# Patient Record
Sex: MALE | Race: OTHER | Employment: FULL TIME | URBAN - METROPOLITAN AREA
[De-identification: names, ages, dates, MRNs, and addresses within clinical notes are randomized per-mention and may not be internally consistent; named-entity substitution may affect disease eponyms.]

---

## 2018-08-01 ENCOUNTER — HOSPITAL ENCOUNTER (EMERGENCY)
Facility: HOSPITAL | Age: 38
Discharge: HOME | End: 2018-08-01

## 2018-08-01 ENCOUNTER — APPOINTMENT (OUTPATIENT)
Dept: ULTRASOUND IMAGING | Facility: HOSPITAL | Age: 38
End: 2018-08-01
Attending: NURSE PRACTITIONER

## 2018-08-01 VITALS
SYSTOLIC BLOOD PRESSURE: 127 MMHG | RESPIRATION RATE: 18 BRPM | WEIGHT: 240.06 LBS | DIASTOLIC BLOOD PRESSURE: 64 MMHG | TEMPERATURE: 99 F | HEIGHT: 62 IN | HEART RATE: 60 BPM | BODY MASS INDEX: 44.18 KG/M2 | OXYGEN SATURATION: 96 %

## 2018-08-01 DIAGNOSIS — S81.802A OPEN WOUND OF LEFT LOWER LEG, INITIAL ENCOUNTER: Primary | ICD-10-CM

## 2018-08-01 LAB
ALBUMIN SERPL BCP-MCNC: 3.9 G/DL (ref 3.5–4.8)
ALP SERPL-CCNC: 79 U/L (ref 32–100)
ALT SERPL-CCNC: 71 U/L (ref 17–63)
ANION GAP SERPL CALC-SCNC: 6 MMOL/L (ref 0–18)
AST SERPL-CCNC: 48 U/L (ref 15–41)
BASOPHILS # BLD: 0 K/UL (ref 0–0.2)
BASOPHILS NFR BLD: 0 %
BILIRUB DIRECT SERPL-MCNC: 0.1 MG/DL (ref 0–0.2)
BILIRUB SERPL-MCNC: 0.5 MG/DL (ref 0.3–1.2)
BUN SERPL-MCNC: 20 MG/DL (ref 8–20)
BUN/CREAT SERPL: 18.3 (ref 10–20)
CALCIUM SERPL-MCNC: 9.3 MG/DL (ref 8.5–10.5)
CHLORIDE SERPL-SCNC: 102 MMOL/L (ref 95–110)
CO2 SERPL-SCNC: 29 MMOL/L (ref 22–32)
CREAT SERPL-MCNC: 1.09 MG/DL (ref 0.5–1.5)
EOSINOPHIL # BLD: 0.1 K/UL (ref 0–0.7)
EOSINOPHIL NFR BLD: 1 %
ERYTHROCYTE [DISTWIDTH] IN BLOOD BY AUTOMATED COUNT: 13.3 % (ref 11–15)
GLUCOSE SERPL-MCNC: 122 MG/DL (ref 70–99)
HCT VFR BLD AUTO: 41.7 % (ref 41–52)
HGB BLD-MCNC: 14.3 G/DL (ref 13.5–17.5)
LYMPHOCYTES # BLD: 2.1 K/UL (ref 1–4)
LYMPHOCYTES NFR BLD: 19 %
MCH RBC QN AUTO: 32.3 PG (ref 27–32)
MCHC RBC AUTO-ENTMCNC: 34.2 G/DL (ref 32–37)
MCV RBC AUTO: 94.4 FL (ref 80–100)
MONOCYTES # BLD: 0.7 K/UL (ref 0–1)
MONOCYTES NFR BLD: 7 %
NEUTROPHILS # BLD AUTO: 7.8 K/UL (ref 1.8–7.7)
NEUTROPHILS NFR BLD: 73 %
OSMOLALITY UR CALC.SUM OF ELEC: 288 MOSM/KG (ref 275–295)
PLATELET # BLD AUTO: 292 K/UL (ref 140–400)
PMV BLD AUTO: 7.2 FL (ref 7.4–10.3)
POTASSIUM SERPL-SCNC: 3.6 MMOL/L (ref 3.3–5.1)
PROT SERPL-MCNC: 7.4 G/DL (ref 5.9–8.4)
RBC # BLD AUTO: 4.41 M/UL (ref 4.5–5.9)
SODIUM SERPL-SCNC: 137 MMOL/L (ref 136–144)
WBC # BLD AUTO: 10.8 K/UL (ref 4–11)

## 2018-08-01 PROCEDURE — 85025 COMPLETE CBC W/AUTO DIFF WBC: CPT | Performed by: NURSE PRACTITIONER

## 2018-08-01 PROCEDURE — 99284 EMERGENCY DEPT VISIT MOD MDM: CPT

## 2018-08-01 PROCEDURE — 93970 EXTREMITY STUDY: CPT | Performed by: NURSE PRACTITIONER

## 2018-08-01 PROCEDURE — 80076 HEPATIC FUNCTION PANEL: CPT | Performed by: NURSE PRACTITIONER

## 2018-08-01 PROCEDURE — 80048 BASIC METABOLIC PNL TOTAL CA: CPT | Performed by: NURSE PRACTITIONER

## 2018-08-01 PROCEDURE — 36415 COLL VENOUS BLD VENIPUNCTURE: CPT

## 2018-08-01 NOTE — ED PROVIDER NOTES
Patient Seen in: [facility] AND [facility] Emergency Department    History   Patient presents with:  Cellulitis (integumentary, infectious)    Stated Complaint: wound on left leg.     HPI    Patient presents into the emergency room for evaluation of a wound to th heard.  Pulmonary/Chest: Effort normal. He has no wheezes. He has no rales. Musculoskeletal: Normal range of motion.    Focused exam of the left lower extremity: No palpable left thigh left knee anterior lower leg posterior calf ankle or foot tenderness o on his clinical history and exam he will have routine screening labs, as well as a lower leg ultrasound.     Results for orders placed or performed during the hospital encounter of 37/05/22  -BASIC METABOLIC PANEL (8)   Collection Time: 08/01/18  4:13 PM apply to the wound on his lower leg.     Disposition and Plan     Clinical Impression:  Open wound of left lower leg, initial encounter  (primary encounter diagnosis)    Disposition:  Discharge  8/1/2018  5:19 pm    Follow-up:  Toya Navas MD  [id number]

## 2018-08-01 NOTE — ED INITIAL ASSESSMENT (HPI)
Patient here with c/o chronic wound to left lower leg since last November. Patient states it started as a 'black bump.' Has not been seen before for this.

## 2018-09-10 ENCOUNTER — HOSPITAL ENCOUNTER (EMERGENCY)
Facility: HOSPITAL | Age: 38
Discharge: HOME | End: 2018-09-10
Attending: EMERGENCY MEDICINE

## 2018-09-10 VITALS
SYSTOLIC BLOOD PRESSURE: 128 MMHG | BODY MASS INDEX: 44 KG/M2 | RESPIRATION RATE: 18 BRPM | WEIGHT: 240 LBS | HEART RATE: 87 BPM | TEMPERATURE: 99 F | DIASTOLIC BLOOD PRESSURE: 82 MMHG | OXYGEN SATURATION: 96 %

## 2018-09-10 DIAGNOSIS — T14.8XXA BLEEDING FROM WOUND: Primary | ICD-10-CM

## 2018-09-10 LAB
BASOPHILS # BLD: 0.1 K/UL (ref 0–0.2)
BASOPHILS NFR BLD: 1 %
EOSINOPHIL # BLD: 0.1 K/UL (ref 0–0.7)
EOSINOPHIL NFR BLD: 1 %
ERYTHROCYTE [DISTWIDTH] IN BLOOD BY AUTOMATED COUNT: 13.9 % (ref 11–15)
HCT VFR BLD AUTO: 40.3 % (ref 41–52)
HGB BLD-MCNC: 13.5 G/DL (ref 13.5–17.5)
LYMPHOCYTES # BLD: 1.4 K/UL (ref 1–4)
LYMPHOCYTES NFR BLD: 15 %
MCH RBC QN AUTO: 32.4 PG (ref 27–32)
MCHC RBC AUTO-ENTMCNC: 33.6 G/DL (ref 32–37)
MCV RBC AUTO: 96.4 FL (ref 80–100)
MONOCYTES # BLD: 0.6 K/UL (ref 0–1)
MONOCYTES NFR BLD: 6 %
NEUTROPHILS # BLD AUTO: 7.2 K/UL (ref 1.8–7.7)
NEUTROPHILS NFR BLD: 78 %
PLATELET # BLD AUTO: 261 K/UL (ref 140–400)
PMV BLD AUTO: 7 FL (ref 7.4–10.3)
RBC # BLD AUTO: 4.17 M/UL (ref 4.5–5.9)
WBC # BLD AUTO: 9.3 K/UL (ref 4–11)

## 2018-09-10 PROCEDURE — 12001 RPR S/N/AX/GEN/TRNK 2.5CM/<: CPT

## 2018-09-10 PROCEDURE — 96360 HYDRATION IV INFUSION INIT: CPT

## 2018-09-10 PROCEDURE — 99284 EMERGENCY DEPT VISIT MOD MDM: CPT

## 2018-09-10 PROCEDURE — 85025 COMPLETE CBC W/AUTO DIFF WBC: CPT | Performed by: EMERGENCY MEDICINE

## 2018-09-10 RX ORDER — LIDOCAINE HYDROCHLORIDE AND EPINEPHRINE 20; 5 MG/ML; UG/ML
20 INJECTION, SOLUTION EPIDURAL; INFILTRATION; INTRACAUDAL; PERINEURAL ONCE
Status: COMPLETED | OUTPATIENT
Start: 2018-09-10 | End: 2018-09-10

## 2018-09-11 NOTE — ED INITIAL ASSESSMENT (HPI)
Bleeding to left ankle since 1700. Patient has homemade tourniquet applied proximal to wound. Friend states patient had pimple that burst today.

## 2018-09-11 NOTE — ED PROVIDER NOTES
Patient Seen in: [facility] Emergency Department    History   No chief complaint on file.     Stated Complaint: left leg bleeding    HPI    54-year-old male with no significant past medical history here with complaints of acute onset left leg bleed 121   Resp 20   Temp 98.7 °F (37.1 °C)   Temp src Oral   SpO2 95 %   O2 Device None (Room air)       Current:BP (!) 150/101   Pulse 98   Temp 98.7 °F (37.1 °C) (Oral)   Resp 20   Wt 108.9 kg   SpO2 96%   BMI 43.90 kg/m²         Physical Exam   Constitution Local analgesia was obtained using lidocaine with epi 1%. The wound was thoroughly cleansed, irrigated, and explored. No evidence of foreign body was noted. Deep structures evaluated and debridement of tissue performed.   I discussed with the patient romy discussed      The patient was informed of their elevated blood pressure reading in the Emergency Department. They were informed of the dangers of undiagnosed and untreated hypertension.   Education regarding lifestyle modifications and the need for approp

## 2025-02-10 ENCOUNTER — APPOINTMENT (OUTPATIENT)
Dept: GENERAL RADIOLOGY | Facility: HOSPITAL | Age: 45
End: 2025-02-10
Attending: EMERGENCY MEDICINE

## 2025-02-10 ENCOUNTER — APPOINTMENT (OUTPATIENT)
Dept: ULTRASOUND IMAGING | Facility: HOSPITAL | Age: 45
End: 2025-02-10
Attending: EMERGENCY MEDICINE

## 2025-02-10 ENCOUNTER — HOSPITAL ENCOUNTER (EMERGENCY)
Facility: HOSPITAL | Age: 45
Discharge: HOME | End: 2025-02-10
Attending: EMERGENCY MEDICINE

## 2025-02-10 ENCOUNTER — APPOINTMENT (OUTPATIENT)
Dept: GENERAL RADIOLOGY | Facility: HOSPITAL | Age: 45
End: 2025-02-10

## 2025-02-10 VITALS
WEIGHT: 293 LBS | HEIGHT: 67 IN | SYSTOLIC BLOOD PRESSURE: 148 MMHG | HEART RATE: 79 BPM | TEMPERATURE: 100 F | BODY MASS INDEX: 45.99 KG/M2 | DIASTOLIC BLOOD PRESSURE: 79 MMHG | RESPIRATION RATE: 24 BRPM | OXYGEN SATURATION: 92 %

## 2025-02-10 DIAGNOSIS — R06.02 CHRONIC SHORTNESS OF BREATH: ICD-10-CM

## 2025-02-10 DIAGNOSIS — R60.0 PERIPHERAL EDEMA: Primary | ICD-10-CM

## 2025-02-10 LAB
ALBUMIN SERPL-MCNC: 4.3 G/DL (ref 3.2–4.8)
ALBUMIN/GLOB SERPL: 1.3 {RATIO} (ref 1–2)
ALP LIVER SERPL-CCNC: 110 U/L
ALT SERPL-CCNC: 111 U/L
ANION GAP SERPL CALC-SCNC: 7 MMOL/L (ref 0–18)
AST SERPL-CCNC: 128 U/L (ref ?–34)
BASOPHILS # BLD AUTO: 0.04 X10(3) UL (ref 0–0.2)
BASOPHILS NFR BLD AUTO: 0.5 %
BILIRUB SERPL-MCNC: 0.4 MG/DL (ref 0.3–1.2)
BUN BLD-MCNC: 18 MG/DL (ref 9–23)
BUN/CREAT SERPL: 21.7 (ref 10–20)
CALCIUM BLD-MCNC: 9.2 MG/DL (ref 8.7–10.4)
CHLORIDE SERPL-SCNC: 104 MMOL/L (ref 98–112)
CO2 SERPL-SCNC: 30 MMOL/L (ref 21–32)
CREAT BLD-MCNC: 0.83 MG/DL
DEPRECATED RDW RBC AUTO: 44.1 FL (ref 35.1–46.3)
EGFRCR SERPLBLD CKD-EPI 2021: 111 ML/MIN/1.73M2 (ref 60–?)
EOSINOPHIL # BLD AUTO: 0.33 X10(3) UL (ref 0–0.7)
EOSINOPHIL NFR BLD AUTO: 4.2 %
ERYTHROCYTE [DISTWIDTH] IN BLOOD BY AUTOMATED COUNT: 12.4 % (ref 11–15)
GLOBULIN PLAS-MCNC: 3.3 G/DL (ref 2–3.5)
GLUCOSE BLD-MCNC: 104 MG/DL (ref 70–99)
HCT VFR BLD AUTO: 42.2 %
HGB BLD-MCNC: 13.9 G/DL
IMM GRANULOCYTES # BLD AUTO: 0.03 X10(3) UL (ref 0–1)
IMM GRANULOCYTES NFR BLD: 0.4 %
LIPASE SERPL-CCNC: 32 U/L (ref 12–53)
LYMPHOCYTES # BLD AUTO: 1.75 X10(3) UL (ref 1–4)
LYMPHOCYTES NFR BLD AUTO: 22.1 %
MCH RBC QN AUTO: 32 PG (ref 26–34)
MCHC RBC AUTO-ENTMCNC: 32.9 G/DL (ref 31–37)
MCV RBC AUTO: 97.2 FL
MONOCYTES # BLD AUTO: 0.53 X10(3) UL (ref 0.1–1)
MONOCYTES NFR BLD AUTO: 6.7 %
NEUTROPHILS # BLD AUTO: 5.23 X10 (3) UL (ref 1.5–7.7)
NEUTROPHILS # BLD AUTO: 5.23 X10(3) UL (ref 1.5–7.7)
NEUTROPHILS NFR BLD AUTO: 66.1 %
NT-PROBNP SERPL-MCNC: 45 PG/ML (ref ?–125)
OSMOLALITY SERPL CALC.SUM OF ELEC: 294 MOSM/KG (ref 275–295)
PLATELET # BLD AUTO: 228 10(3)UL (ref 150–450)
POTASSIUM SERPL-SCNC: 3.6 MMOL/L (ref 3.5–5.1)
PROT SERPL-MCNC: 7.6 G/DL (ref 5.7–8.2)
RBC # BLD AUTO: 4.34 X10(6)UL
SODIUM SERPL-SCNC: 141 MMOL/L (ref 136–145)
TROPONIN I SERPL HS-MCNC: 12 NG/L
WBC # BLD AUTO: 7.9 X10(3) UL (ref 4–11)

## 2025-02-10 PROCEDURE — 93010 ELECTROCARDIOGRAM REPORT: CPT

## 2025-02-10 PROCEDURE — 80053 COMPREHEN METABOLIC PANEL: CPT | Performed by: EMERGENCY MEDICINE

## 2025-02-10 PROCEDURE — 71045 X-RAY EXAM CHEST 1 VIEW: CPT

## 2025-02-10 PROCEDURE — 84484 ASSAY OF TROPONIN QUANT: CPT | Performed by: EMERGENCY MEDICINE

## 2025-02-10 PROCEDURE — 93005 ELECTROCARDIOGRAM TRACING: CPT

## 2025-02-10 PROCEDURE — 83690 ASSAY OF LIPASE: CPT | Performed by: EMERGENCY MEDICINE

## 2025-02-10 PROCEDURE — 83880 ASSAY OF NATRIURETIC PEPTIDE: CPT | Performed by: EMERGENCY MEDICINE

## 2025-02-10 PROCEDURE — 85025 COMPLETE CBC W/AUTO DIFF WBC: CPT | Performed by: EMERGENCY MEDICINE

## 2025-02-10 PROCEDURE — 36415 COLL VENOUS BLD VENIPUNCTURE: CPT

## 2025-02-10 PROCEDURE — 93970 EXTREMITY STUDY: CPT | Performed by: EMERGENCY MEDICINE

## 2025-02-10 PROCEDURE — 99285 EMERGENCY DEPT VISIT HI MDM: CPT

## 2025-02-10 NOTE — ED INITIAL ASSESSMENT (HPI)
Pt arrives through triage with family        complaints of bilateral lower leg swelling that started about a month ago. Reports abd distention. CLAUDINE when ambulating. Reports unable to lay flat d/t claudine. Pt was seen at primary MD and was advised to come to ED for eval

## 2025-02-11 LAB
ATRIAL RATE: 71 BPM
P AXIS: 38 DEGREES
P-R INTERVAL: 144 MS
Q-T INTERVAL: 424 MS
QRS DURATION: 90 MS
QTC CALCULATION (BEZET): 460 MS
R AXIS: 99 DEGREES
T AXIS: 59 DEGREES
VENTRICULAR RATE: 71 BPM

## 2025-02-11 NOTE — DISCHARGE INSTRUCTIONS
Please follow-up with your primary care provider, cardiology, GI, pulmonology.  Your labs were stable today.  You may benefit from further workup regarding your chronic symptoms over 1 year.  Return to ER for chest pain, shortness of breath, increased work of breathing, fever of 100.4, abdominal pain.  He will require more of a workup but today's labs were stable for discharge.      The Emergency Department is not intended to be a substitute for an effort to provide complete medical care. The imaging, if any, have often been interpreted on a preliminary basis pending final reading by the radiologist. If your blood pressure was greater than 140/90, please have this blood pressure rechecked by your primary care provider christopher the next few days. You will benefit from a further discussion of lifestyle modifications that include Weight Reduction - Dietary Sodium Restriction - Increased Physical Activity and Moderation in alcohol (ETOH) Consumption. If possible check your pressure at home and keep a blood pressure log to bring to your physician.      Results for orders placed or performed during the hospital encounter of 02/10/25   EKG 12 Lead    Collection Time: 02/10/25  5:53 PM   Result Value Ref Range    Ventricular rate 71 BPM    Atrial rate 71 BPM    P-R Interval 144 ms    QRS Duration 90 ms    Q-T Interval 424 ms    QTC Calculation (Bezet) 460 ms    P Axis 38 degrees    R Axis 99 degrees    T Axis 59 degrees   CBC With Differential With Platelet    Collection Time: 02/10/25  6:36 PM   Result Value Ref Range    WBC 7.9 4.0 - 11.0 x10(3) uL    RBC 4.34 4.30 - 5.70 x10(6)uL    HGB 13.9 13.0 - 17.5 g/dL    HCT 42.2 39.0 - 53.0 %    MCV 97.2 80.0 - 100.0 fL    MCH 32.0 26.0 - 34.0 pg    MCHC 32.9 31.0 - 37.0 g/dL    RDW-SD 44.1 35.1 - 46.3 fL    RDW 12.4 11.0 - 15.0 %    .0 150.0 - 450.0 10(3)uL    Neutrophil Absolute Prelim 5.23 1.50 - 7.70 x10 (3) uL    Neutrophil Absolute 5.23 1.50 - 7.70 x10(3) uL     Lymphocyte Absolute 1.75 1.00 - 4.00 x10(3) uL    Monocyte Absolute 0.53 0.10 - 1.00 x10(3) uL    Eosinophil Absolute 0.33 0.00 - 0.70 x10(3) uL    Basophil Absolute 0.04 0.00 - 0.20 x10(3) uL    Immature Granulocyte Absolute 0.03 0.00 - 1.00 x10(3) uL    Neutrophil % 66.1 %    Lymphocyte % 22.1 %    Monocyte % 6.7 %    Eosinophil % 4.2 %    Basophil % 0.5 %    Immature Granulocyte % 0.4 %   Comp Metabolic Panel (14)    Collection Time: 02/10/25  6:36 PM   Result Value Ref Range    Glucose 104 (H) 70 - 99 mg/dL    Sodium 141 136 - 145 mmol/L    Potassium 3.6 3.5 - 5.1 mmol/L    Chloride 104 98 - 112 mmol/L    CO2 30.0 21.0 - 32.0 mmol/L    Anion Gap 7 0 - 18 mmol/L    BUN 18 9 - 23 mg/dL    Creatinine 0.83 0.70 - 1.30 mg/dL    BUN/CREA Ratio 21.7 (H) 10.0 - 20.0    Calcium, Total 9.2 8.7 - 10.4 mg/dL    Calculated Osmolality 294 275 - 295 mOsm/kg    eGFR-Cr 111 >=60 mL/min/1.73m2     (H) 10 - 49 U/L     (H) <34 U/L    Alkaline Phosphatase 110 45 - 117 U/L    Bilirubin, Total 0.4 0.3 - 1.2 mg/dL    Total Protein 7.6 5.7 - 8.2 g/dL    Albumin 4.3 3.2 - 4.8 g/dL    Globulin  3.3 2.0 - 3.5 g/dL    A/G Ratio 1.3 1.0 - 2.0   Pro Beta Natriuretic Peptide    Collection Time: 02/10/25  6:36 PM   Result Value Ref Range    Pro-Beta Natriuretic Peptide 45 <125 pg/mL   Troponin I (High Sensitivity)    Collection Time: 02/10/25  6:36 PM   Result Value Ref Range    Troponin I (High Sensitivity) 12 <=53 ng/L   Lipase    Collection Time: 02/10/25  6:36 PM   Result Value Ref Range    Lipase 32 12 - 53 U/L   RAINBOW DRAW LAVENDER    Collection Time: 02/10/25  6:36 PM   Result Value Ref Range    Hold Lavender Auto Resulted    RAINBOW DRAW LIGHT GREEN    Collection Time: 02/10/25  6:36 PM   Result Value Ref Range    Hold Lt Green Auto Resulted    RAINBOW DRAW BLUE    Collection Time: 02/10/25  6:36 PM   Result Value Ref Range    Hold Blue Auto Resulted    RAINBOW DRAW GOLD    Collection Time: 02/10/25  6:36 PM   Result  Value Ref Range    Hold Gold Auto Resulted

## 2025-02-11 NOTE — ED PROVIDER NOTES
Patient Seen in: [facility]         EMERGENCY DEPARTMENT NOTE    Dictated. Voice Transcription software has been utilized for this dictation (the reader should be aware that typographical errors are possible with voice transcription software and to please contact the dictating physician if there are questions.)         History   No chief complaint on file.      There may be discrepancies from triage note.     HPI    History provided by patient.  44-year-old male, denies any medical problems presents to the emergency room for chronic symptoms.  States that he saw primary care provider for routine visit and was told to come to the emergency room for a workup.   Reports 1 year of bilateral lower extremity swelling, abdominal distention and shortness of breath.  States that he requires 1-2 pillows to sit up at night due to shortness of breath.  States that he frequently falls asleep and wakes up gasping for air.    Shortness of breath is worse with exertion.  No chest pain.  Denies history of ACS, PE, DVT.  Reports frequent alcohol intake however primarily on the weekends.  Denies history of withdrawal.  Reports being immunocompetent  States that the symptoms have been stable for 1 year.  No crescendoing symptoms no chest pain.  No pleuritic pain.    No fevers, chills, nausea, vomiting, diarrhea, constipation, cough, cold symptoms, urinary complaints.  No chest pain  No headache, neck pain, neck stiffness, incontinence.  No changes in mentation, no changes in vision, no total/new extremity weakness, no total/new extremity paresthesia, no difficulty speaking.  No alleviating or exacerbating factors.      History reviewed. No past medical history on file.    History reviewed. No past surgical history on file.      Medications :  Prescriptions Prior to Admission[1]     No family history on file.    Smoking Status:   Social History     Socioeconomic History    Marital status: Single   Tobacco Use    Smoking status:  Every Day     Types: Cigarettes    Smokeless tobacco: Never   Vaping Use    Vaping status: Never Used   Substance and Sexual Activity    Alcohol use: Yes     Comment: weekends    Drug use: Never       Review of Systems   Constitutional: Negative.  Negative for fever.   HENT: Negative.     Eyes: Negative.    Respiratory:  Positive for shortness of breath.    Cardiovascular:  Positive for leg swelling. Negative for chest pain.   Gastrointestinal: Negative.  Negative for abdominal pain and blood in stool.   Genitourinary: Negative.    Musculoskeletal: Negative.    Skin: Negative.    Neurological: Negative.    Endo/Heme/Allergies: Negative.    Psychiatric/Behavioral: Negative.     All other systems reviewed and are negative.    Pertinent positives as listed.  All other organ systems are reviewed and are negative.    Constitutional and vital signs reviewed.      Social History and Family History elements reviewed from today, pertinent positives to the presenting problem noted.    Physical Exam     ED Triage Vitals [02/10/25 1744]   /85   Pulse 80   Resp 25   Temp 99.7 °F (37.6 °C)   Temp src Temporal   SpO2 95 %   O2 Device None (Room air)       All measures to prevent infection transmission during my interaction with the patient were taken. The patient was already wearing a droplet mask on my arrival to the room. Personal protective equipment including droplet mask, eye protection, and gloves were worn throughout the duration of the exam.  Handwashing was performed prior to and after the exam.  Stethoscope and any equipment used during my examination was cleaned with super sani-cloth germicidal wipes following the exam.     Physical Exam  Vitals and nursing note reviewed.   Constitutional:       General: He is not in acute distress.     Appearance: He is obese. He is not ill-appearing or toxic-appearing.   Cardiovascular:      Rate and Rhythm: Normal rate and regular rhythm.      Comments: Dorsalis pedis pulses 2+  bilaterally    Pulmonary:      Effort: Pulmonary effort is normal. No respiratory distress.      Breath sounds: No stridor. No wheezing, rhonchi or rales.      Comments: Decreased air exchange, speaks in full sentences, saturating 97% on room  Appear short of breath with speech  Chest:      Chest wall: No tenderness.   Abdominal:      General: There is no distension.      Palpations: Abdomen is soft.      Tenderness: There is no abdominal tenderness. There is no guarding or rebound.      Comments: Negative Kim sign, negative McBurney's point tenderness     Musculoskeletal:      Cervical back: Normal range of motion and neck supple.      Right lower leg: Edema present.      Left lower leg: Edema present.   Skin:     Capillary Refill: Capillary refill takes less than 2 seconds.   Neurological:      General: No focal deficit present.      Mental Status: He is alert and oriented to person, place, and time.      Comments: Gross motor and sensory function intact symmetrically and bilaterally to upper extremities and lower extremities.   Psychiatric:         Mood and Affect: Mood normal.         Behavior: Behavior normal.           Review of prior notes in Care everywhere/Epic performed by myself:  Lfts from 2018 with mild elevation of ast /alt   Cr from 2018 normal     ED Course     If labs obtained, they are personally reviewed by myself:     Labs Reviewed   COMP METABOLIC PANEL (14) - Abnormal; Notable for the following components:       Result Value    Glucose 104 (*)     BUN/CREA Ratio 21.7 (*)      (*)      (*)     All other components within normal limits   PRO BETA NATRIURETIC PEPTIDE - Normal   TROPONIN I HIGH SENSITIVITY - Normal   LIPASE - Normal   CBC WITH DIFFERENTIAL WITH PLATELET   RAINBOW DRAW LAVENDER   RAINBOW DRAW LIGHT GREEN   RAINBOW DRAW BLUE   RAINBOW DRAW GOLD       If radiologic studies ordered during today's ER visit, my independent interpretation are seen directly below.  This is  awaiting the radiologist's final interpretation.  Chest X-ray, independent interpretation completed by myself and awaiting formal radiology read: Cardiomegaly, increased pulmonary vascular jose    B DVT study, independent interpretation of radiologic study completed by myself and awaiting formal radiologist interpretation: No obvious DVT    Imaging Results read by radiology in ED: US VENOUS DOPPLER LEG BILAT - DIAG IMG (CPT=93970)    Result Date: 2/10/2025  CONCLUSION: No sonographic evidence of bilateral lower extremity DVT.    Dictated by (CST): Bal Catalan MD on 2/10/2025 at 9:10 PM     Finalized by (CST): Bal Catalan MD on 2/10/2025 at 9:10 PM          XR CHEST AP PORTABLE  (CPT=71045)    Result Date: 2/10/2025  CONCLUSION: Slight cardiac enlargement with secondary signs of minimal fluid overload.    Dictated by (CST): Bal Catalan MD on 2/10/2025 at 6:31 PM     Finalized by (CST): Bal Catalan MD on 2/10/2025 at 6:31 PM               ED Medications Administered: Medications - No data to display        Vitals:    02/10/25 1744 02/10/25 1945   BP: 157/85 140/77   Pulse: 80 81   Resp: 25 24   Temp: 99.7 °F (37.6 °C)    TempSrc: Temporal    SpO2: 95% 94%   Weight: 132.9 kg    Height: 170.2 cm (5' 7\")      *I personally reviewed and interpreted all ED vitals.    Pulse Ox interpretation by myself: 95%, Room air, Normal     Monitor Interpretation by myself:   normal sinus rhythm    If Ekg obtained during today's visit, it is independently interpreted by myself directly below:  EKG    Rate, intervals and axes as noted on EKG Report.  Rate: 71  Rhythm: Sinus Rhythm  Reading: no st elevation, no st depression, normal t waves                Medical Record Review: I personally reviewed available prior medical records for any recent pertinent discharge summaries, testing, and procedures and reviewed those reports.      [facility]     Medical decision making/ED Course:   44-year-old immunocompetent male  complaining of 1 year of noncrescendoing symptoms including bilateral lower extremity swelling, abdominal distention and shortness of breath.  No abdominal pain.  Saturating between 93 to 95% on room air.  Obese.  He is very clear to mention that his symptoms have been stable over the course of 1 year with no crescendoing symptoms.  Clear lungs, saturating 94% during her interview.  Slightly tachypneic however I suspect this is from his body habitus and not from an acute life-threatening pathology.  His  chest x-ray reveals slightly increased vascular markings however this is a one-view portable chest x-ray.  BNP normal at 45.  Acute decompensated CHF considered and seems less likely.  Status post ambulation stress test she is satting 92 to 93% which again I suspect is more secondary to his body habitus as opposed to true decompensated, acute CHF.    Troponin negative, EKG stable.  ACS considered and unlikely.  No pleuritic pain to suggest PE.  I suspect that his gasping from sleep may be secondary to obstructive sleep apnea.  BMP normal.  LFTs with slight elevation of ALT and AST which patient has had previously.  Other LFTs essentially stable.  Albumin normal.  Creatinine thankfully normal.  Acute renal/hepatic insufficiency considered and unlikely.    Bilateral DVT study negative.    Patient encouraged to follow with primary care provider as an outpatient.  Have also encouraged him to follow with cardiology and pulmonary and possibly GI as an outpatient.  He will require more of a workup.  He is comfortable with discharge and strict return precautions given.        Acute decompensated CHF, PE, ACS, peritonitis, DVT, among other life-threatening medical conditions considered and seems unlikely given patient's history, exam, and appearance.  Patient encouraged to follow-up with primary care provider in the next few days.  Advised to return to the emergency department for any worsening symptoms    Patient is non  toxic appearing, is in no distress, hemodynamically stable.  Pt agrees and is aware of plan.   Smiling upon discharge      Differential Diagnosis:  as listed above in medical decision making.   *Please note that in the presenting to the emergency department, illness/injury that poses a threat to life or function is considered during this patient's initial evaluation.    The complexity of this visit is therefore inherently more complex given the need to consider life threatening pathology prior to any other etiology for this patient's visit.    The differential diagnosis and medical decision above exemplify this rationale.       Medical Decision Making  Problems Addressed:  Chronic shortness of breath: undiagnosed new problem with uncertain prognosis  Peripheral edema: undiagnosed new problem with uncertain prognosis    Amount and/or Complexity of Data Reviewed  External Data Reviewed: labs and notes.  Labs: ordered. Decision-making details documented in ED Course.  Radiology: ordered and independent interpretation performed. Decision-making details documented in ED Course.  ECG/medicine tests: ordered and independent interpretation performed. Decision-making details documented in ED Course.               Vitals:    02/10/25 1744 02/10/25 1945   BP: 157/85 140/77   Pulse: 80 81   Resp: 25 24   Temp: 99.7 °F (37.6 °C)    TempSrc: Temporal    SpO2: 95% 94%   Weight: 132.9 kg    Height: 170.2 cm (5' 7\")              Complicating Factors: Significant medical problems that contribute to the complexity of this emergency room evaluation is listed above.    Condition upon leaving the department: Stable    Disposition and Plan     Clinical Impression:  1. Peripheral edema        Disposition:  Discharge    Medications Prescribed:  There are no discharge medications for this patient.      I have discussed the discharge plan with the patient and/or family or well wisher present in the room with the patient's permission.  They  state that they understand and agree with the plan.  All questions regarding their care have been answered prior to discharge.  They are aware: Emergency Department is not intended to be a substitute for an effort to provide complete medical care. The imaging, if any, have often been interpreted on a preliminary basis pending final reading by the radiologist.  Instructed to return immediately to the ED if any changes or worsening of condition should occur.  If patient's blood pressure was greater than 140/90 today, patient encouraged to have this blood pressure rechecked with primary MD and blood pressure education provided.                     [1] (Not in a hospital admission)

## 2025-07-10 ENCOUNTER — APPOINTMENT (OUTPATIENT)
Dept: GENERAL RADIOLOGY | Facility: HOSPITAL | Age: 45
End: 2025-07-10
Attending: EMERGENCY MEDICINE

## 2025-07-10 ENCOUNTER — HOSPITAL ENCOUNTER (EMERGENCY)
Facility: HOSPITAL | Age: 45
Discharge: HOME | End: 2025-07-10
Attending: EMERGENCY MEDICINE

## 2025-07-10 ENCOUNTER — APPOINTMENT (OUTPATIENT)
Dept: ULTRASOUND IMAGING | Facility: HOSPITAL | Age: 45
End: 2025-07-10
Attending: EMERGENCY MEDICINE

## 2025-07-10 VITALS
WEIGHT: 304.25 LBS | DIASTOLIC BLOOD PRESSURE: 70 MMHG | BODY MASS INDEX: 48 KG/M2 | OXYGEN SATURATION: 97 % | SYSTOLIC BLOOD PRESSURE: 150 MMHG | RESPIRATION RATE: 24 BRPM | HEART RATE: 74 BPM | TEMPERATURE: 98 F

## 2025-07-10 DIAGNOSIS — L03.116 CELLULITIS OF LEFT LOWER EXTREMITY: Primary | ICD-10-CM

## 2025-07-10 LAB
ALBUMIN SERPL-MCNC: 4.3 G/DL (ref 3.2–4.8)
ALBUMIN/GLOB SERPL: 1.4 {RATIO} (ref 1–2)
ALP LIVER SERPL-CCNC: 101 U/L (ref 45–117)
ALT SERPL-CCNC: 127 U/L (ref 10–49)
ANION GAP SERPL CALC-SCNC: 8 MMOL/L (ref 0–18)
AST SERPL-CCNC: 157 U/L (ref ?–34)
BASOPHILS # BLD AUTO: 0.02 X10(3) UL (ref 0–0.2)
BASOPHILS NFR BLD AUTO: 0.3 %
BILIRUB SERPL-MCNC: 0.5 MG/DL (ref 0.3–1.2)
BUN BLD-MCNC: 16 MG/DL (ref 9–23)
BUN/CREAT SERPL: 20.3 (ref 10–20)
CALCIUM BLD-MCNC: 8.8 MG/DL (ref 8.7–10.4)
CHLORIDE SERPL-SCNC: 102 MMOL/L (ref 98–112)
CO2 SERPL-SCNC: 29 MMOL/L (ref 21–32)
CREAT BLD-MCNC: 0.79 MG/DL (ref 0.7–1.3)
DEPRECATED RDW RBC AUTO: 45.8 FL (ref 35.1–46.3)
EGFRCR SERPLBLD CKD-EPI 2021: 112 ML/MIN/1.73M2 (ref 60–?)
EOSINOPHIL # BLD AUTO: 0.33 X10(3) UL (ref 0–0.7)
EOSINOPHIL NFR BLD AUTO: 4.5 %
ERYTHROCYTE [DISTWIDTH] IN BLOOD BY AUTOMATED COUNT: 12.8 % (ref 11–15)
GLOBULIN PLAS-MCNC: 3.1 G/DL (ref 2–3.5)
GLUCOSE BLD-MCNC: 119 MG/DL (ref 70–99)
GLUCOSE BLDC GLUCOMTR-MCNC: 117 MG/DL (ref 70–99)
HCT VFR BLD AUTO: 40.7 % (ref 39–53)
HGB BLD-MCNC: 13.5 G/DL (ref 13–17.5)
IMM GRANULOCYTES # BLD AUTO: 0.04 X10(3) UL (ref 0–1)
IMM GRANULOCYTES NFR BLD: 0.5 %
LYMPHOCYTES # BLD AUTO: 1.45 X10(3) UL (ref 1–4)
LYMPHOCYTES NFR BLD AUTO: 19.6 %
MCH RBC QN AUTO: 32.5 PG (ref 26–34)
MCHC RBC AUTO-ENTMCNC: 33.2 G/DL (ref 31–37)
MCV RBC AUTO: 97.8 FL (ref 80–100)
MONOCYTES # BLD AUTO: 0.54 X10(3) UL (ref 0.1–1)
MONOCYTES NFR BLD AUTO: 7.3 %
NEUTROPHILS # BLD AUTO: 5 X10 (3) UL (ref 1.5–7.7)
NEUTROPHILS # BLD AUTO: 5 X10(3) UL (ref 1.5–7.7)
NEUTROPHILS NFR BLD AUTO: 67.8 %
NT-PROBNP SERPL-MCNC: 51 PG/ML (ref ?–125)
OSMOLALITY SERPL CALC.SUM OF ELEC: 290 MOSM/KG (ref 275–295)
PLATELET # BLD AUTO: 228 10(3)UL (ref 150–450)
POTASSIUM SERPL-SCNC: 3.6 MMOL/L (ref 3.5–5.1)
PROT SERPL-MCNC: 7.4 G/DL (ref 5.7–8.2)
RBC # BLD AUTO: 4.16 X10(6)UL (ref 4.3–5.7)
SODIUM SERPL-SCNC: 139 MMOL/L (ref 136–145)
TROPONIN I SERPL HS-MCNC: 9 NG/L (ref ?–53)
WBC # BLD AUTO: 7.4 X10(3) UL (ref 4–11)

## 2025-07-10 PROCEDURE — 93970 EXTREMITY STUDY: CPT | Performed by: RADIOLOGY

## 2025-07-10 PROCEDURE — 87040 BLOOD CULTURE FOR BACTERIA: CPT | Performed by: EMERGENCY MEDICINE

## 2025-07-10 PROCEDURE — 84484 ASSAY OF TROPONIN QUANT: CPT | Performed by: EMERGENCY MEDICINE

## 2025-07-10 PROCEDURE — 80053 COMPREHEN METABOLIC PANEL: CPT

## 2025-07-10 PROCEDURE — 99284 EMERGENCY DEPT VISIT MOD MDM: CPT

## 2025-07-10 PROCEDURE — 83880 ASSAY OF NATRIURETIC PEPTIDE: CPT | Performed by: EMERGENCY MEDICINE

## 2025-07-10 PROCEDURE — 85025 COMPLETE CBC W/AUTO DIFF WBC: CPT | Performed by: EMERGENCY MEDICINE

## 2025-07-10 PROCEDURE — 82962 GLUCOSE BLOOD TEST: CPT

## 2025-07-10 PROCEDURE — 80053 COMPREHEN METABOLIC PANEL: CPT | Performed by: EMERGENCY MEDICINE

## 2025-07-10 PROCEDURE — 99285 EMERGENCY DEPT VISIT HI MDM: CPT

## 2025-07-10 PROCEDURE — 36415 COLL VENOUS BLD VENIPUNCTURE: CPT

## 2025-07-10 PROCEDURE — 71045 X-RAY EXAM CHEST 1 VIEW: CPT | Performed by: RADIOLOGY

## 2025-07-10 PROCEDURE — 85025 COMPLETE CBC W/AUTO DIFF WBC: CPT

## 2025-07-10 RX ORDER — IBUPROFEN 400 MG/1
400 TABLET, FILM COATED ORAL EVERY 8 HOURS PRN
Qty: 10 TABLET | Refills: 0 | Status: SHIPPED | OUTPATIENT
Start: 2025-07-10 | End: 2025-07-17

## 2025-07-10 RX ORDER — ACETAMINOPHEN 500 MG
1000 TABLET ORAL ONCE
Status: COMPLETED | OUTPATIENT
Start: 2025-07-10 | End: 2025-07-10

## 2025-07-10 RX ORDER — DOXYCYCLINE 100 MG/1
100 CAPSULE ORAL 2 TIMES DAILY
Qty: 20 CAPSULE | Refills: 0 | Status: SHIPPED | OUTPATIENT
Start: 2025-07-10 | End: 2025-07-20

## 2025-07-10 NOTE — ED INITIAL ASSESSMENT (HPI)
Pt to ED with c/o wound and swelling to right lower leg. Pt with recent surgery on veins to left leg on Tuesday. Pt denies fever. Pt states +drainage noted to wound. Pt states he is \"pre diabetic\".   Denies dyspnea or chest pain. Pt able to speak in full sentences. No respiratory distress noted. Pt is alert and oriented x 4.     Language line #[id number]

## 2025-07-10 NOTE — ED PROVIDER NOTES
[facility]  Emergency Department Attending Note     Chief Complaint:   Chief Complaint   Patient presents with    Cellulitis     HISTORY OF PRESENT ILLNESS:   Graham Deshawn Antoine is a 45 year old male who presents to the ED with complaints of an open wound to the left leg.  Patient states that 2 days ago he had bilateral lower extremity varicose vein surgery which she tolerated well but over the last day or so he has had increased pain and redness to the left calf.  No chest pain or dyspnea.  No pleuritic or exertional complaint.  No paralysis paresthesia.     MEDICAL & SOCIAL HISTORY:   Past Medical History[1] Past Surgical History[2] Short Social Hx on File[3] Allergies[4] Current Medications[5]       REVIEW OF SYSTEMS   A 10 point review of systems was completed and is negative except as listed in history of present illness      PHYSICAL EXAM   Vitals: /74   Pulse 77   Temp 98 °F (36.7 °C) (Temporal)   Resp 26   Wt (!) 138 kg   SpO2 94%   BMI 47.65 kg/m²   /74   Pulse 77   Temp 98 °F (36.7 °C) (Temporal)   Resp 26   Wt (!) 138 kg   SpO2 94%   BMI 47.65 kg/m²     General: A&Ox3, NAD  Constitutional: Well developed, well nourished, nontoxic  Head: atraumatic, normocephalic   Eyes: conjuctiva clear, no icterus  Ears: normal external appearance, no drainage  Nose:  Atraumatic, no swelling, no drainage, nares patent  Throat:  Moist pink mucous membranes, airway is patent  Neck:  Soft supple, no masses, no tracheal deviation, no stridor  Chest:  No bruising or abrasions, no tenderness, no deformity  Cardiac:  Regular rate and rhythm  Lung:  No distress, no retractions. Clear to auscultation bilaterally, no w/r/r  Abdomen:  Soft, nontender, nondistended, normal BS  Back: No stepoff/deformity  Extremities: FROM all ext, no deformities, intact equal peripheral pulses, no cyanosis or edema intact dorsalis pedis and posterior tibialis pulses bilaterally full range of motion to the  digits with normal distal capillary refill less than 2 seconds full range of motion to the ankle and to the knee, some tenderness to the medial ankle up to the mid calf with slight erythema and warmth, some clear fluid draining from a 1 cm wound.  No purulence.  No crepitus.  Repeat examination without any rapid spread  Neuro: No facial droop, no slurred speech, moving all extremities freely, SILT to the bilateral upper and lower extremities  Psych: A&Ox3, normal affect, cooperative, calm  Skin: No rash, no petechiae/purpura, warm, dry      RESULTS  LABS:   Results for orders placed or performed during the hospital encounter of 07/10/25   POCT Glucose    Collection Time: 07/10/25 12:21 PM   Result Value Ref Range    POC Glucose  117 (H) 70 - 99 mg/dL   CBC With Differential With Platelet    Collection Time: 07/10/25 12:57 PM   Result Value Ref Range    WBC 7.4 4.0 - 11.0 x10(3) uL    RBC 4.16 (L) 4.30 - 5.70 x10(6)uL    HGB 13.5 13.0 - 17.5 g/dL    HCT 40.7 39.0 - 53.0 %    MCV 97.8 80.0 - 100.0 fL    MCH 32.5 26.0 - 34.0 pg    MCHC 33.2 31.0 - 37.0 g/dL    RDW-SD 45.8 35.1 - 46.3 fL    RDW 12.8 11.0 - 15.0 %    .0 150.0 - 450.0 10(3)uL    Neutrophil Absolute Prelim 5.00 1.50 - 7.70 x10 (3) uL    Neutrophil Absolute 5.00 1.50 - 7.70 x10(3) uL    Lymphocyte Absolute 1.45 1.00 - 4.00 x10(3) uL    Monocyte Absolute 0.54 0.10 - 1.00 x10(3) uL    Eosinophil Absolute 0.33 0.00 - 0.70 x10(3) uL    Basophil Absolute 0.02 0.00 - 0.20 x10(3) uL    Immature Granulocyte Absolute 0.04 0.00 - 1.00 x10(3) uL    Neutrophil % 67.8 %    Lymphocyte % 19.6 %    Monocyte % 7.3 %    Eosinophil % 4.5 %    Basophil % 0.3 %    Immature Granulocyte % 0.5 %   Comp Metabolic Panel (14)    Collection Time: 07/10/25 12:58 PM   Result Value Ref Range    Glucose 119 (H) 70 - 99 mg/dL    Sodium 139 136 - 145 mmol/L    Potassium 3.6 3.5 - 5.1 mmol/L    Chloride 102 98 - 112 mmol/L    CO2 29.0 21.0 - 32.0 mmol/L    Anion Gap 8 0 - 18 mmol/L     BUN 16 9 - 23 mg/dL    Creatinine 0.79 0.70 - 1.30 mg/dL    BUN/CREA Ratio 20.3 (H) 10.0 - 20.0    Calcium, Total 8.8 8.7 - 10.4 mg/dL    Calculated Osmolality 290 275 - 295 mOsm/kg    eGFR-Cr 112 >=60 mL/min/1.73m2     (H) 10 - 49 U/L     (H) <34 U/L    Alkaline Phosphatase 101 45 - 117 U/L    Bilirubin, Total 0.5 0.3 - 1.2 mg/dL    Total Protein 7.4 5.7 - 8.2 g/dL    Albumin 4.3 3.2 - 4.8 g/dL    Globulin  3.1 2.0 - 3.5 g/dL    A/G Ratio 1.4 1.0 - 2.0   Troponin I (High Sensitivity)    Collection Time: 07/10/25 12:58 PM   Result Value Ref Range    Troponin I (High Sensitivity) 9 <=53 ng/L   Pro Beta Natriuretic Peptide    Collection Time: 07/10/25 12:58 PM   Result Value Ref Range    Pro-Beta Natriuretic Peptide 51 <125 pg/mL   RAINBOW DRAW BLUE    Collection Time: 07/10/25 12:58 PM   Result Value Ref Range    Hold Blue Auto Resulted          IMAGING: US VENOUS DOPPLER LEG BILAT - DIAG IMG (CPT=93970)  Result Date: 7/10/2025  CONCLUSION: 1.  Normal bilateral lower extremity venous duplex exam. 2.  No deep venous thrombosis visualized. Electronically Verified and Signed by Attending Radiologist: Gavino Garcia MD 7/10/2025 3:58 PM Workstation: [id number]    XR CHEST AP/PA (1 VIEW) (CPT=71045)  Result Date: 7/10/2025  CONCLUSION: No acute cardiopulmonary finding. Electronically Verified and Signed by Attending Radiologist: Michael Carrero MD 7/10/2025 2:41 PM Workstation: [id number]      I personally reviewed the radiology study and my finding were ultrasound negative for DVT      Procedures:   Procedures       ED COURSE          Re-Evaluation: Improved      Disposition & Plan:   Clinical Impression/Final Diagnosis:   1. Cellulitis of left lower extremity        Medical Decision Making: Ultrasound is negative for DVT, there is no leukocytosis, there is no fever or tachycardia and the patient is nontoxic in appearance.  Repeat examination does not show any rapid spread and exam is not suggestive  of a necrotizing process.  Exam is most consistent with cellulitic process and there is no visible bone or tendon or deep structure to suggest an osteomyelitis.  Will start doxycycline as outpatient and I did give the patient extensive return precautions and he assures me he will follow-up with his doctor in the next day or 2 and return immediately for new or worsening symptoms or concerns but the patient relies understanding and agreement this plan.    Medical Decision Making  Amount and/or Complexity of Data Reviewed  External Data Reviewed: notes.  Labs: ordered. Decision-making details documented in ED Course.  Radiology: ordered and independent interpretation performed. Decision-making details documented in ED Course.    Risk  OTC drugs.  Prescription drug management.  Decision regarding hospitalization.  Risk Details: Broad differential considered including but not limited to necrotizing fasciitis, cellulitis, DVT        *Please note that in the presenting to the emergency department, illness/injury that poses a threat to life or function is considered during this patient's initial evaluation.    The complexity of this visit is therefore inherently more complex given the need to consider life threatening pathology prior to any other etiology for this patient's visit.    The medical decision above exemplifies this rationale.     Disposition: Discharge  There are no disposition comments on file for this visit.  MDM          Disposition and Plan     Clinical Impression:  1. Cellulitis of left lower extremity         Disposition:  Discharge  7/10/2025  4:13 pm    Follow-up:  Ava Frazier MD  [address] [place] [id number] [phone]    Schedule an appointment as soon as possible for a visit in 2 day(s)            Medications Prescribed:  Current Discharge Medication List        START taking these medications    Details   doxycycline 100 MG Oral Cap Take 1 capsule (100 mg total) by mouth 2 (two) times  daily for 10 days.  Qty: 20 capsule, Refills: 0      ibuprofen 400 MG Oral Tab Take 1 tablet (400 mg total) by mouth every 8 (eight) hours as needed for Pain or Fever.  Qty: 10 tablet, Refills: 0             Supplementary Documentation:                                                     This note was generated in part using voice recognition dictation technology. The report was reviewed by this physician but still may have unintentional errors due to inherent limitations of voice recognition technology. All times are estimates.         [1] History reviewed. No pertinent past medical history.  [2] History reviewed. No pertinent surgical history.  [3]   Social History  Socioeconomic History    Marital status: Single   Tobacco Use    Smoking status: Every Day     Types: Cigarettes    Smokeless tobacco: Never   Vaping Use    Vaping status: Never Used   Substance and Sexual Activity    Alcohol use: Yes     Comment: weekends    Drug use: Never   [4] No Known Allergies  [5]   Current Outpatient Medications   Medication Sig Dispense Refill    doxycycline 100 MG Oral Cap Take 1 capsule (100 mg total) by mouth 2 (two) times daily for 10 days. 20 capsule 0    ibuprofen 400 MG Oral Tab Take 1 tablet (400 mg total) by mouth every 8 (eight) hours as needed for Pain or Fever. 10 tablet 0

## (undated) NOTE — ED AVS SNAPSHOT
Graham Willian Lee   MRN: [id number]    Department:  [facility] Emergency Department   Date of Visit:  9/10/2018           Disclosure     Insurance plans vary and the physician(s) referred by the ER may not be covered by your plan.  P CARE PHYSICIAN AT ONCE OR RETURN IMMEDIATELY TO THE EMERGENCY DEPARTMENT. If you have been prescribed any medication(s), please fill your prescription right away and begin taking the medication(s) as directed.   If you believe that any of the medications

## (undated) NOTE — ED AVS SNAPSHOT
Junior Frantz Bo   MRN: [id number]    Department:  [facility] Emergency Department   Date of Visit:  8/1/2018           Disclosure     Insurance plans vary and the physician(s) referred by the ER may not be covered by your plan.  Pl CARE PHYSICIAN AT ONCE OR RETURN IMMEDIATELY TO THE EMERGENCY DEPARTMENT. If you have been prescribed any medication(s), please fill your prescription right away and begin taking the medication(s) as directed.   If you believe that any of the medications